# Patient Record
Sex: FEMALE | Race: WHITE
[De-identification: names, ages, dates, MRNs, and addresses within clinical notes are randomized per-mention and may not be internally consistent; named-entity substitution may affect disease eponyms.]

---

## 2018-06-18 ENCOUNTER — HOSPITAL ENCOUNTER (OUTPATIENT)
Dept: HOSPITAL 62 - RAD | Age: 53
End: 2018-06-18
Attending: OTOLARYNGOLOGY
Payer: MEDICARE

## 2018-06-18 DIAGNOSIS — M54.2: ICD-10-CM

## 2018-06-18 DIAGNOSIS — K11.20: ICD-10-CM

## 2018-06-18 DIAGNOSIS — E04.2: Primary | ICD-10-CM

## 2018-06-18 PROCEDURE — 70490 CT SOFT TISSUE NECK W/O DYE: CPT

## 2018-06-18 NOTE — RADIOLOGY REPORT (SQ)
EXAM DESCRIPTION:  CT SOFT TISSUE NECK WITHOUT



COMPLETED DATE/TIME:  6/18/2018 3:22 pm



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



COMPARISON:  None.



TECHNIQUE:  Noncontrast scanning from skull base through lung apices with review of bone, soft tissue
 and lung windows.  Reconstructed coronal and sagittal MPR images reviewed.  All images stored on PAC
S.

All CT scanners at this facility use dose modulation, iterative reconstruction, and/or weight based d
osing when appropriate to reduce radiation dose to as low as reasonably achievable (ALARA).

[facility]: Dose Right  [facility]: CareDose    [facility]: Dose Right    [facility]: Teradose 4D    [facility]: Smart Technologies



RADIATION DOSE:  20.4 mGy.



LIMITATIONS:  None.



FINDINGS:  SKULL BASE: Inferior brain parenchyma unremarkable

MAJOR SALIVARY GLANDS: No solid or cystic masses.  No inflammatory changes.

LYMPHADENOPATHY: No adenopathy.

MUCOSAL MASSES OR ASYMMETRY: No mucosal masses or asymmetry.

LARYNX/CORDS: No abnormal findings.

LUNG APICES: Clear.

BONES: Intact.

THYROID: Thyroid is normal size, right lobe 4.5 x 3 x 2.4 cm in size.  Left lobe 3.7 x 3 x 2.4 cm in 
size.  Slightly heterogeneous in density without gross masses.

PARANASAL SINUSES: Clear.

OTHER: No other significant finding.



IMPRESSION:  NO SIGNIFICANT FINDING IN THE SOFT TISSUES OF THE NECK.



TECHNICAL DOCUMENTATION:  JOB ID:  [id number]

Quality ID # [id number]: Final reports with documentation of one or more dose reduction techniques (e.g., Au
tomated exposure control, adjustment of the mA and/or kV according to patient size, use of iterative 
reconstruction technique)

 2011 [employer]- All Rights Reserved



Reading location - IP/workstation name: [email or web address]

## 2018-11-13 ENCOUNTER — HOSPITAL ENCOUNTER (OUTPATIENT)
Dept: HOSPITAL 62 - RAD | Age: 53
End: 2018-11-13
Attending: PAIN MEDICINE
Payer: MEDICARE

## 2018-11-13 DIAGNOSIS — M47.816: Primary | ICD-10-CM

## 2018-11-13 PROCEDURE — 64636 DESTROY L/S FACET JNT ADDL: CPT

## 2018-11-13 PROCEDURE — 64635 DESTROY LUMB/SAC FACET JNT: CPT

## 2018-11-13 NOTE — OPERATIVE REPORT
PREOPERATIVE DIAGNOSIS: Lumbar Spondylosis 


POSTOPERATIVE DIAGNOSIS: Lumbar Spondylosis 


PROCEDURE: Radiofrequency Ablation of medial branches - RT L3 L4 dorsal primary 

ramus of L5. LT L3 L4 Dorsal primary ramus of L5.


DATE OF PROCEDURE: November 13, 2018


ANESTHESIA: Local


COMPLICATIONS: None


CONSENT: A full description of the procedure was provided including benefits as 

well as possible complications. All questions were answered and informed 

consent was given and signed. ASA guidelines for fasting were verified prior to 

sedation. 


PROCEDURE IN DETAIL


The patient was brought into the fluoroscopy suite and positioned into the 

prone position on the fluoroscopy table and allowed to adjust to a position of 

comfort. A grounding pad was placed on the right thigh. The lumbar region was 

widely prepped with a chloraprep solution, allowed to air dry and draped in 

standard sterile surgical fashion. Local anesthesia was provided by 1 mL of 1 % 

lidocaine delivered with a 25 g needle.


A 17g 100mm radiofrequency introducer needle was placed to the planned anatomic 

targets guided with intermittent fluoroscopy with a perpendicular approach to 

terminally place at the junction of the superior articular process and the 

transverse process of the right L4 L5 and the base of the sacral ala on the 

right for the L5 medial branch nerve. The stylets were removed and 

radiofrequency probes with a 4mm active tip were then inserted. Needle tip 

position of the probes was verified in the AP, oblique, and lateral views. At 

each site, the medial branch nerve was stimulated at 2 Hz to a maximum 1-2 

volts determined to finalize safe needle and electrode placement. The patient 

was awake and responsive during this portion of the procedure. Each target was 

anesthetized with 1-2 mL of 2 % lidocaine for anesthesia for lesioning and then 

each target was lesioned at 80 degrees Celsius for 2 minutes and 30 seconds. 

Tissue impedences were noted to be between 250 and 500 Ohms. Electrodes and 

needles were then removed and the underlying tissues were infiltrated with 1mL 

of a mixture of 0.25% Bupivacane and 40mg depomedrol.  Attention was then 

turned to the opposite side where the procedure was performed in identical 

fashion.  Bandages were placed over the needle placement sites, the patient 

then returned to the supine position on a stretcher and transported to the 

recovery room without hemodynamic, neurologic, or allergic reactions. 

Fluoroscopic images were printed for hard copy recording and digitally archived.


POST PROCEDURE EVALUATION: The patient was comfortable in the recovery room. 

The patient is aware that pain may worsen before remitting and 4 - 6 weeks may 

be required prior to the onset of pain relief. 


IMPRESSION: 


1. Technically successful left and right L3 L4 L5 medial branch radiofrequency 

neurotomy for denervation without complication.


2. RTC in 2 weeks.


3. Estimated Blood Loss: None

## 2019-07-10 ENCOUNTER — HOSPITAL ENCOUNTER (OUTPATIENT)
Dept: HOSPITAL 62 - RAD | Age: 54
Discharge: HOME | End: 2019-07-10
Attending: PSYCHIATRY & NEUROLOGY
Payer: MEDICARE

## 2019-07-10 VITALS — DIASTOLIC BLOOD PRESSURE: 73 MMHG | SYSTOLIC BLOOD PRESSURE: 121 MMHG

## 2019-07-10 DIAGNOSIS — Z79.01: ICD-10-CM

## 2019-07-10 DIAGNOSIS — D64.9: ICD-10-CM

## 2019-07-10 DIAGNOSIS — G35: Primary | ICD-10-CM

## 2019-07-10 DIAGNOSIS — G47.30: ICD-10-CM

## 2019-07-10 LAB
APPEARANCE ALL TUBES: CLEAR
APTT BLD: 30.1 SEC (ref 23.5–35.8)
BUN SERPL-MCNC: 16 MG/DL (ref 7–20)
COLOR ALL TUBES: COLORLESS
CSF TOTAL VOLUME: 9.8 CC
CSF TUBE NUMBER: 3
ERYTHROCYTE [DISTWIDTH] IN BLOOD BY AUTOMATED COUNT: 13 % (ref 11.5–14)
GLUCOSE CSF-MCNC: 93 MG/DL (ref 40–70)
HCT VFR BLD CALC: 44.8 % (ref 36–47)
HGB BLD-MCNC: 15.2 G/DL (ref 12–15.5)
INR PPP: 1
MCH RBC QN AUTO: 32.1 PG (ref 27–33.4)
MCHC RBC AUTO-ENTMCNC: 33.8 G/DL (ref 32–36)
MCV RBC AUTO: 95 FL (ref 80–97)
PLATELET # BLD: 235 10^3/UL (ref 150–450)
PROTEIN,CSF: 56 MG/DL (ref 12–60)
PROTHROMBIN TIME: 13.2 SEC (ref 11.4–15.4)
RBC # BLD AUTO: 4.73 10^6/UL (ref 3.72–5.28)
VOLUME TUBE 1: 2 CC
VOLUME TUBE 2: 2.5 CC
VOLUME TUBE 3: 2.5 CC
VOLUME TUBE 4: 2.8 CC
WBC # BLD AUTO: 5.6 10^3/UL (ref 4–10.5)

## 2019-07-10 PROCEDURE — 83916 OLIGOCLONAL BANDS: CPT

## 2019-07-10 PROCEDURE — 84157 ASSAY OF PROTEIN OTHER: CPT

## 2019-07-10 PROCEDURE — 62270 DX LMBR SPI PNXR: CPT

## 2019-07-10 PROCEDURE — 82945 GLUCOSE OTHER FLUID: CPT

## 2019-07-10 PROCEDURE — 85610 PROTHROMBIN TIME: CPT

## 2019-07-10 PROCEDURE — 82565 ASSAY OF CREATININE: CPT

## 2019-07-10 PROCEDURE — 77003 FLUOROGUIDE FOR SPINE INJECT: CPT

## 2019-07-10 PROCEDURE — 87205 SMEAR GRAM STAIN: CPT

## 2019-07-10 PROCEDURE — 85027 COMPLETE CBC AUTOMATED: CPT

## 2019-07-10 PROCEDURE — 87070 CULTURE OTHR SPECIMN AEROBIC: CPT

## 2019-07-10 PROCEDURE — 82784 ASSAY IGA/IGD/IGG/IGM EACH: CPT

## 2019-07-10 PROCEDURE — 85730 THROMBOPLASTIN TIME PARTIAL: CPT

## 2019-07-10 PROCEDURE — 36415 COLL VENOUS BLD VENIPUNCTURE: CPT

## 2019-07-10 PROCEDURE — 84520 ASSAY OF UREA NITROGEN: CPT

## 2019-07-10 PROCEDURE — 89050 BODY FLUID CELL COUNT: CPT

## 2019-07-10 PROCEDURE — 82962 GLUCOSE BLOOD TEST: CPT

## 2019-07-10 NOTE — RADIOLOGY REPORT (SQ)
EXAM DESCRIPTION:  LUMBAR PUNCTURE; FLUORO/NEEDLE PLACEMENT/SPINE



COMPLETED DATE/TIME:  7/10/2019 11:35 am



REASON FOR STUDY:  MULTIPLE SCLEROSIS G35  MULTIPLE SCLEROSIS Z79.01  LONG TERM (CURRENT) USE OF ANTI
COAGULANTS



COMPARISON:  None.



FLUOROSCOPY TIME:  8 seconds

1 images saved to PACS.



TECHNIQUE:  Fluoroscopic guided lumbar puncture.



LIMITATIONS:  None.



PROCEDURE:  After written consent and assessment were obtained, the patient was brought into the fluo
roscopy room and placed prone on the table.  The patient's lower back was prepped in a sterile fashio
n and an entry site was selected under live fluoroscopic guidance. The entry site was anesthetized wi
th 1% lidocaine. A 20 gauge needle was advanced through the skin and into the thecal sac at the level
 of L3-L4.  Opening pressure of 17 water units.  Closing pressure of 15 water units.  After approxima
tely 9 ml was drained, the needle was removed and a sterile bandage was placed of the site.  Specimen
s were sent to the lab for testing.  A fluoroscopic spot image was saved to PACS confirming level acc
ess.



FINDINGS:  Clear CSF



IMPRESSION:  Lumbar puncture under fluoroscopy. No immediate complication.



COMMENT:  Patient medication list reviewed: Yes- Quality ID# [id number]:Eligible professional attests to doc
umenting in the medical record they obtained, updated, or reviewed the patient's current medications.
.

Quality :  Final reports for procedures using fluoroscopy that document radiation exposure terrence
amarjit, or exposure time and number of fluorographic images (if radiation exposure indices are not avail
able)



TECHNICAL DOCUMENTATION:  JOB ID:  [id number]

 2011 [employer]- All Rights Reserved



Reading location - IP/workstation name: [email or web address]

## 2019-07-10 NOTE — RADIOLOGY REPORT (SQ)
EXAM DESCRIPTION:  LUMBAR PUNCTURE; FLUORO/NEEDLE PLACEMENT/SPINE



COMPLETED DATE/TIME:  7/10/2019 11:35 am



REASON FOR STUDY:  MULTIPLE SCLEROSIS G35  MULTIPLE SCLEROSIS Z79.01  LONG TERM (CURRENT) USE OF ANTI
COAGULANTS



COMPARISON:  None.



FLUOROSCOPY TIME:  8 seconds

1 images saved to PACS.



TECHNIQUE:  Fluoroscopic guided lumbar puncture.



LIMITATIONS:  None.



PROCEDURE:  After written consent and assessment were obtained, the patient was brought into the fluo
roscopy room and placed prone on the table.  The patient's lower back was prepped in a sterile fashio
n and an entry site was selected under live fluoroscopic guidance. The entry site was anesthetized wi
th 1% lidocaine. A 20 gauge needle was advanced through the skin and into the thecal sac at the level
 of L3-L4.  Opening pressure of 17 water units.  Closing pressure of 15 water units.  After approxima
tely 9 ml was drained, the needle was removed and a sterile bandage was placed of the site.  Specimen
s were sent to the lab for testing.  A fluoroscopic spot image was saved to PACS confirming level acc
ess.



FINDINGS:  Clear CSF



IMPRESSION:  Lumbar puncture under fluoroscopy. No immediate complication.



COMMENT:  Patient medication list reviewed: Yes- Quality ID# [id number]:Eligible professional attests to doc
umenting in the medical record they obtained, updated, or reviewed the patient's current medications.
.

Quality :  Final reports for procedures using fluoroscopy that document radiation exposure terrence
amarjit, or exposure time and number of fluorographic images (if radiation exposure indices are not avail
able)



TECHNICAL DOCUMENTATION:  JOB ID:  [id number]

 2011 [employer]- All Rights Reserved



Reading location - IP/workstation name: [email or web address]

## 2019-07-11 LAB
ALBUMIN CSF-MCNC: 25 MG/DL (ref 11–48)
ALBUMIN SERPL-MCNC: 4.5 G/DL (ref 3.5–5.5)
IGG SERPL-MCNC: 551 MG/DL (ref 700–1600)
IGG SYNTH RATE SER+CSF CALC-MRATE: -1.4 MG/DAY
IGG/ALB CLEAR SER+CSF-RTO: 0.5 (ref 0–0.7)
IGG/ALB CSF: 0.06 {RATIO} (ref 0–0.25)
IMMUNOGLOBULIN G CSF: 1.5 MG/DL (ref 0–8.6)

## 2019-07-12 LAB — ALB CSF/SERPL: 6 {RATIO} (ref 0–8)

## 2020-09-03 ENCOUNTER — HOSPITAL ENCOUNTER (OUTPATIENT)
Dept: HOSPITAL 62 - NEURO | Age: 55
End: 2020-09-03
Attending: PSYCHIATRY & NEUROLOGY
Payer: MEDICARE

## 2020-09-03 DIAGNOSIS — Z79.891: ICD-10-CM

## 2020-09-03 DIAGNOSIS — G31.84: Primary | ICD-10-CM

## 2020-09-03 DIAGNOSIS — Z79.899: ICD-10-CM

## 2020-09-03 PROCEDURE — 95819 EEG AWAKE AND ASLEEP: CPT

## 2020-09-03 NOTE — NEURO WORKBENCH EEG REPORT
EEG Report



Patient:  Elsa Saba

ID: [id number]

Referring Doctor: Evan Canseco

Date: 09/03/2020

Reason for study: Evaluate Epileptiform activity



Medications: Miralax, Zofran, Restoril, Ativan, Colace, Crestor, Vivaflex, 
Metformin, Jardiance, Synthroid, Oxycodone, Aciphex, Phenergan, Imitrex, 
Wellbutrin, Melatonin, Trulicity.



History:

This is a 55 year old female with a history of mild cognitive impairment, 
chronic pain and opiate use, diabetes, Hyperthyroidism, lumbar degenerative disc
disease, arthritis, fibromyalgia, GERD, RAJNI, anemia, anxiety, depression, 
hypercholesterolemia, and cerebral small vessel ischemic disease. This EEG was 
requested for evaluation of epileptiform activity.



EEG Interpretation:



This EEG was recorded during wakefulness and stage I. The awake EEG is 
characterized by a well organized background with a well developed and reactive 
posterior dominant rhythm (PDR) of approximately 10 Hz. The remainder of the 
background consisted of low amplitude diffuse beta activity. The EEG is 
symmetric in amplitudes and frequencies. There was occasional Mu rhythm noted in
the bilateral central regions. Lambda waves were occasionally seen in the 
occipital leads.  Photic stimulation resulted in excellent photic driving, and 
there was no epileptiform activity elicited with photic stimulation. 
Hyperventilation resulted in the appearance of diffuse predominantly 5-7 Hz 
theta activity (normal for age) and no epileptiform activity was elicited. 



Stage I sleep was achieved and characterized by slow rolling eye movements, 
slowing of the background rhythm, and POSTs. Stage II sleep was not achieved. 



There were no definitive epileptiform abnormalities (no sharp waves and no 
spikes). There were no seizures. There were rare sharply contoured waveforms in 
the left central region which did not appear definitively epileptiform in 
etiology, and are likely just benign phase reversals in the double-banana 
montage as they did not appear impressive in referential montages.



The EKG showed a regular rhythm with typically 70-85 beats per minute.



EEG Impression:



This EEG is within normal limits for age. There was no epileptiform activity or 
seizures. A single normal routine EEG does not rule out the possibility of 
epilepsy. If there is high clinical suspicion for epilepsy, then additional EEG 
evaluation should be considered with a sleep-deprived EEG or more prolonged EEG 
monitoring.

 

INTERPRETING NEUROLOGIST:  Manuel Schmitz MD

Board certified by the American Academy of [employer] and [employer]iat[employer] in 
Neurology, Clinical Neurophysiology, and Sleep Medicine

[facility]

## 2020-10-05 ENCOUNTER — HOSPITAL ENCOUNTER (OUTPATIENT)
Dept: HOSPITAL 62 - OD | Age: 55
End: 2020-10-05
Payer: MEDICARE

## 2020-10-05 DIAGNOSIS — Z79.891: Primary | ICD-10-CM

## 2020-10-05 PROCEDURE — 93010 ELECTROCARDIOGRAM REPORT: CPT

## 2020-10-05 PROCEDURE — 93005 ELECTROCARDIOGRAM TRACING: CPT

## 2020-10-05 NOTE — EKG REPORT
SEVERITY:- BORDERLINE ECG -

SINUS RHYTHM

PROBABLE LEFT ATRIAL ABNORMALITY

:

Confirmed by: Rupali Araujo MD 05-Oct-2020 12:46:47